# Patient Record
Sex: FEMALE | Race: WHITE | HISPANIC OR LATINO | URBAN - METROPOLITAN AREA
[De-identification: names, ages, dates, MRNs, and addresses within clinical notes are randomized per-mention and may not be internally consistent; named-entity substitution may affect disease eponyms.]

---

## 2019-02-12 ENCOUNTER — OFFICE VISIT (OUTPATIENT)
Dept: URBAN - METROPOLITAN AREA CLINIC 17 | Facility: CLINIC | Age: 54
End: 2019-02-12
Payer: MEDICARE

## 2019-02-12 DIAGNOSIS — H43.11 VITREOUS HEMORRHAGE, RIGHT EYE: ICD-10-CM

## 2019-02-12 DIAGNOSIS — H35.61 RETINAL HEMORRHAGE, RIGHT EYE: Primary | ICD-10-CM

## 2019-02-12 PROCEDURE — 76512 OPH US DX B-SCAN: CPT | Performed by: OPTOMETRIST

## 2019-02-12 PROCEDURE — 92014 COMPRE OPH EXAM EST PT 1/>: CPT | Performed by: OPTOMETRIST

## 2019-02-12 ASSESSMENT — INTRAOCULAR PRESSURE
OD: 12
OS: 13

## 2019-02-12 ASSESSMENT — KERATOMETRY
OD: 44.25
OS: 46.88

## 2019-02-12 NOTE — IMPRESSION/PLAN
Impression: Retinal hemorrhage, right eye: H35.61. Plan: Discussed diagnosis with pt. Will refer to Dr. Sheridan Owens for evaluation.

## 2019-02-12 NOTE — IMPRESSION/PLAN
Impression: Vitreous hemorrhage, right eye: H43.11. Plan: Discussed diagnosis with pt. Will refer to Dr. Norma Ramirez for evaluation.

## 2019-04-29 ENCOUNTER — OFFICE VISIT (OUTPATIENT)
Dept: URBAN - METROPOLITAN AREA CLINIC 17 | Facility: CLINIC | Age: 54
End: 2019-04-29
Payer: MEDICARE

## 2019-04-29 DIAGNOSIS — H43.311 VITREOUS MEMBRANES AND STRANDS, RIGHT EYE: ICD-10-CM

## 2019-04-29 PROCEDURE — 92014 COMPRE OPH EXAM EST PT 1/>: CPT | Performed by: OPHTHALMOLOGY

## 2019-04-29 PROCEDURE — 92134 CPTRZ OPH DX IMG PST SGM RTA: CPT | Performed by: OPHTHALMOLOGY

## 2019-04-29 RX ORDER — OFLOXACIN 3 MG/ML
0.3 % SOLUTION/ DROPS OPHTHALMIC
Qty: 5 | Refills: 3 | Status: INACTIVE
Start: 2019-04-29 | End: 2019-06-04

## 2019-04-29 RX ORDER — PREDNISOLONE ACETATE 10 MG/ML
1 % SUSPENSION/ DROPS OPHTHALMIC
Qty: 10 | Refills: 5 | Status: INACTIVE
Start: 2019-04-29 | End: 2019-09-16

## 2019-04-29 ASSESSMENT — INTRAOCULAR PRESSURE
OD: 16
OS: 16

## 2019-04-29 NOTE — IMPRESSION/PLAN
Impression: Age-related hypermature cataract of left eye: H25.22. OS. Condition: severe. Vision: vision affected. Plan: No treatment currently recommended.

## 2019-04-29 NOTE — IMPRESSION/PLAN
Impression: Vitreous membranes and strands, right eye: H43.311. OD. Condition: unstable. Vision: vision affected. Plan: Advised patient of condition. Discussed diagnosis in detail with patient. Surgical treatment is required.  See notes above

## 2019-04-29 NOTE — IMPRESSION/PLAN
Impression: Vitreous hemorrhage Secondary to PDR, right eye: H43.11. OD. Condition: unstable. Vision: vision affected. Plan: Advised patient of condition. Discussed diagnosis in detail with patient. Surgical treatment is required.  See notes above

## 2019-04-29 NOTE — IMPRESSION/PLAN
Impression: Type 2 diabetes mellitus w/ proliferative diabetic retinopathy w/ macular edema, right eye: e11.3511. OD. Condition: unstable. Vision: vision affected. no prior treatment Plan: Discussed diagnosis in detail with patient. Discussed risks of progression. Surgical treatment is recommended in order to remove the blood for possible vision improvement PPVx. Surgical risks and benefits were discussed, explained and understood by patient. All questions answered. RL1. Educational material provided to patient. Patient understands that additional RADHA treatments or laser treatments may be needed in the future. 
ERxed drops to pharmacy on file

## 2019-05-21 ENCOUNTER — SURGERY (OUTPATIENT)
Dept: URBAN - METROPOLITAN AREA SURGERY 7 | Facility: SURGERY | Age: 54
End: 2019-05-21
Payer: MEDICARE

## 2019-05-21 PROCEDURE — 67041 VIT FOR MACULAR PUCKER: CPT | Performed by: OPHTHALMOLOGY

## 2019-05-22 ENCOUNTER — POST-OPERATIVE VISIT (OUTPATIENT)
Dept: URBAN - METROPOLITAN AREA CLINIC 17 | Facility: CLINIC | Age: 54
End: 2019-05-22

## 2019-05-22 PROCEDURE — 99024 POSTOP FOLLOW-UP VISIT: CPT | Performed by: OPTOMETRIST

## 2019-05-22 ASSESSMENT — INTRAOCULAR PRESSURE
OD: 16
OS: 17

## 2019-06-04 ENCOUNTER — POST-OPERATIVE VISIT (OUTPATIENT)
Dept: URBAN - METROPOLITAN AREA CLINIC 17 | Facility: CLINIC | Age: 54
End: 2019-06-04
Payer: MEDICARE

## 2019-06-04 PROCEDURE — 99024 POSTOP FOLLOW-UP VISIT: CPT | Performed by: OPTOMETRIST

## 2019-06-04 ASSESSMENT — INTRAOCULAR PRESSURE
OD: 17
OS: 15

## 2019-09-16 ENCOUNTER — OFFICE VISIT (OUTPATIENT)
Dept: URBAN - METROPOLITAN AREA CLINIC 17 | Facility: CLINIC | Age: 54
End: 2019-09-16
Payer: MEDICARE

## 2019-09-16 DIAGNOSIS — E11.3511 TYPE 2 DIABETES MELLITUS W/ PROLIFERATIVE DIABETIC RETINOPATHY W/ MACULAR EDEMA, RIGHT EYE: Primary | ICD-10-CM

## 2019-09-16 PROCEDURE — 92014 COMPRE OPH EXAM EST PT 1/>: CPT | Performed by: OPHTHALMOLOGY

## 2019-09-16 ASSESSMENT — INTRAOCULAR PRESSURE
OS: 17
OD: 20

## 2019-09-16 NOTE — IMPRESSION/PLAN
Impression: Type 2 diabetes mellitus w/ proliferative diabetic retinopathy w/ macular edema, right eye: e11.3511. OD. stable post surgery s/p PPVX OD 5/21/19 Plan: Discussed diagnosis in detail with patient. Exam shows the retina to be healing well post vitrectomy, OD. Vision seems to be affected by the large cataract in the right eye. Recommend cataract eval for consideration of cataract surgery OD, then will reassess the retina. Will continue to observe condition and or symptoms. Call if [address] worsens. 
Recommend cataract eval for consideration of cataract surgery OD first.

## 2019-10-08 ENCOUNTER — OFFICE VISIT (OUTPATIENT)
Dept: URBAN - METROPOLITAN AREA CLINIC 17 | Facility: CLINIC | Age: 54
End: 2019-10-08
Payer: MEDICARE

## 2019-10-08 DIAGNOSIS — H25.811 COMBINED FORMS OF AGE-RELATED CATARACT, RIGHT EYE: Primary | ICD-10-CM

## 2019-10-08 DIAGNOSIS — H26.8 OTHER SPECIFIED CATARACT: ICD-10-CM

## 2019-10-08 PROCEDURE — 99213 OFFICE O/P EST LOW 20 MIN: CPT | Performed by: OPHTHALMOLOGY

## 2019-10-08 RX ORDER — OFLOXACIN 3 MG/ML
0.3 % SOLUTION/ DROPS OPHTHALMIC
Qty: 5 | Refills: 1 | Status: INACTIVE
Start: 2019-10-08 | End: 2020-03-18

## 2019-10-08 RX ORDER — PREDNISOLONE ACETATE 10 MG/ML
1 % SUSPENSION/ DROPS OPHTHALMIC
Qty: 10 | Refills: 1 | Status: INACTIVE
Start: 2019-10-08 | End: 2020-03-18

## 2019-10-08 RX ORDER — KETOROLAC TROMETHAMINE 5 MG/ML
0.5 % SOLUTION OPHTHALMIC
Qty: 1 | Refills: 1 | Status: INACTIVE
Start: 2019-10-08 | End: 2020-03-18

## 2019-10-08 ASSESSMENT — KERATOMETRY: OD: 44.50

## 2019-10-08 ASSESSMENT — INTRAOCULAR PRESSURE
OD: 19
OS: 18

## 2019-10-08 ASSESSMENT — VISUAL ACUITY: OD: 20/70

## 2019-10-08 NOTE — IMPRESSION/PLAN
Impression: Combined forms of age-related cataract, right eye: H25.811. Condition: established, worsening. Symptoms: could improve with surgery. Vision: vision affected. Plan: Cataract accounts for patient's complaints. Reviewed risks, benefits, and procedure. Patient desires surgery, schedule ce/iol OD, RL2, standard lens, distance refractive target, patient is clear for surgery in [place] [id number]. Pt to use steroid and NSAID for 6-8 weeks after surgery.

## 2019-10-08 NOTE — IMPRESSION/PLAN
Impression: Other specified cataract: H26.8. OS. Very dense since childhood. Plan: Discussed diagnosis in detail with patient. Advised patient of condition.  Do not believe cataract surgery would be visually beneficial.

## 2019-11-04 ENCOUNTER — PRE-OPERATIVE VISIT (OUTPATIENT)
Dept: URBAN - METROPOLITAN AREA CLINIC 17 | Facility: CLINIC | Age: 54
End: 2019-11-04
Payer: MEDICARE

## 2019-11-04 ASSESSMENT — PACHYMETRY
OS: 22.33
OD: 22.34
OS: 3.71
OD: 3.11

## 2019-11-21 ENCOUNTER — SURGERY (OUTPATIENT)
Dept: URBAN - METROPOLITAN AREA SURGERY 7 | Facility: SURGERY | Age: 54
End: 2019-11-21
Payer: MEDICARE

## 2019-11-21 PROCEDURE — 66984 XCAPSL CTRC RMVL W/O ECP: CPT | Performed by: OPHTHALMOLOGY

## 2019-11-22 ENCOUNTER — POST-OPERATIVE VISIT (OUTPATIENT)
Dept: URBAN - METROPOLITAN AREA CLINIC 17 | Facility: CLINIC | Age: 54
End: 2019-11-22

## 2019-11-22 PROCEDURE — 99024 POSTOP FOLLOW-UP VISIT: CPT | Performed by: OPTOMETRIST

## 2019-11-22 ASSESSMENT — INTRAOCULAR PRESSURE
OS: 14
OD: 14

## 2019-11-26 ENCOUNTER — POST-OPERATIVE VISIT (OUTPATIENT)
Dept: URBAN - METROPOLITAN AREA CLINIC 17 | Facility: CLINIC | Age: 54
End: 2019-11-26

## 2019-11-26 PROCEDURE — 99024 POSTOP FOLLOW-UP VISIT: CPT | Performed by: OPTOMETRIST

## 2019-11-26 ASSESSMENT — INTRAOCULAR PRESSURE
OD: 16
OS: 15

## 2019-11-26 ASSESSMENT — VISUAL ACUITY: OD: 20/50-2

## 2020-01-08 ENCOUNTER — POST-OPERATIVE VISIT (OUTPATIENT)
Dept: URBAN - METROPOLITAN AREA CLINIC 17 | Facility: CLINIC | Age: 55
End: 2020-01-08

## 2020-03-18 ENCOUNTER — OFFICE VISIT (OUTPATIENT)
Dept: URBAN - METROPOLITAN AREA CLINIC 17 | Facility: CLINIC | Age: 55
End: 2020-03-18
Payer: MEDICARE

## 2020-03-18 DIAGNOSIS — E11.3592 TYPE 2 DIABETES MELLITUS W/ PROLIFERATIVE DIABETIC RETINOPATHY W/O MACULAR EDEMA, LEFT EYE: ICD-10-CM

## 2020-03-18 DIAGNOSIS — H25.22 AGE-RELATED HYPERMATURE CATARACT OF LEFT EYE: ICD-10-CM

## 2020-03-18 PROCEDURE — 92134 CPTRZ OPH DX IMG PST SGM RTA: CPT | Performed by: OPHTHALMOLOGY

## 2020-03-18 PROCEDURE — 92014 COMPRE OPH EXAM EST PT 1/>: CPT | Performed by: OPHTHALMOLOGY

## 2020-03-18 PROCEDURE — 76512 OPH US DX B-SCAN: CPT | Performed by: OPHTHALMOLOGY

## 2020-03-18 RX ORDER — DUREZOL 0.5 MG/ML
0.05 % EMULSION OPHTHALMIC
Qty: 5 | Refills: 5 | Status: INACTIVE
Start: 2020-03-18 | End: 2020-07-20

## 2020-03-18 RX ORDER — PREDNISOLONE ACETATE 10 MG/ML
1 % SUSPENSION/ DROPS OPHTHALMIC
Qty: 10 | Refills: 5 | Status: INACTIVE
Start: 2020-03-18 | End: 2020-05-15

## 2020-03-18 ASSESSMENT — INTRAOCULAR PRESSURE
OS: 10
OD: 11

## 2020-03-18 NOTE — IMPRESSION/PLAN
Impression: Age-related hypermature cataract of left eye: H25.22 OS. Vision: vision affected. Plan: Discussed diagnosis in detail with patient. No view of the retina due to Mature Cataract. Will proceed with B - Scan to assess the retina. B- Scan OS shows no RD, no VH.  Consider Cataract sx for possible vision improvement

## 2020-03-18 NOTE — IMPRESSION/PLAN
Impression: Vitreous hemorrhage, right eye associated with PDR: H43.11 OD. Condition: unstable and worsening. Vision: vision affected. Plan: Discussed diagnosis in detail with patient. Discussed risks of progression. Recommend surgery OD - see notes above.

## 2020-03-18 NOTE — IMPRESSION/PLAN
Impression: Vitreous membranes and strands, right eye: H43.311 OD. Condition: unstable. Vision: vision affected. Plan: Discussed diagnosis in detail with patient. Discussed risks of progression. Recommend surgery OD - see notes above.

## 2020-03-18 NOTE — IMPRESSION/PLAN
Impression: Type 2 diabetes mellitus w/ proliferative diabetic retinopathy w/ macular edema, right eye: U57.9838. OD. Condition: unstable. Vision: vision affected. s/p PPVX OD for PDR / [facility] 5/21/19 Plan: Discussed diagnosis in detail with patient. Discussed risks of progression. Poor view of the fundus due to [facility]. Will proceed with B - Scan to assess the retina. B - Scan OD shows no RD with Vitreous Hemorrhage, Surgical treatment is recommended in order to remove the blood for possible vision improvement PPVx RIGHT EYE. Surgical risks and benefits were discussed, explained and understood by patient. All questions answered. RL1. Educational material provided to patient. Patient understands that additional RADHA treatments or laser treatments may be needed in the future. Unable to obtain OCT OD. Erx Durezol and Ofloxacin to patient's pharmacy.

## 2020-03-18 NOTE — IMPRESSION/PLAN
Impression: Type 2 diabetes mellitus w/ proliferative diabetic retinopathy w/o macular edema, left eye: O30.1275. OS. Condition: unstable. Vision: vision affected. Plan: Discussed diagnosis in detail with patient. No view of the retina due to Mature Cataract. Will proceed with B - Scan to assess the retina. B- Scan OS shows no RD, no VH. Consider Cataract sx for possible vision improvement.

## 2020-05-19 ENCOUNTER — SURGERY (OUTPATIENT)
Dept: URBAN - METROPOLITAN AREA SURGERY 7 | Facility: SURGERY | Age: 55
End: 2020-05-19
Payer: MEDICARE

## 2020-05-19 PROCEDURE — 67040 LASER TREATMENT OF RETINA: CPT | Performed by: OPHTHALMOLOGY

## 2020-05-20 ENCOUNTER — POST-OPERATIVE VISIT (OUTPATIENT)
Dept: URBAN - METROPOLITAN AREA CLINIC 17 | Facility: CLINIC | Age: 55
End: 2020-05-20

## 2020-05-20 DIAGNOSIS — Z09 ENCNTR FOR F/U EXAM AFT TRTMT FOR COND OTH THAN MALIG NEOPLM: Primary | ICD-10-CM

## 2020-05-26 ENCOUNTER — POST-OPERATIVE VISIT (OUTPATIENT)
Dept: URBAN - METROPOLITAN AREA CLINIC 17 | Facility: CLINIC | Age: 55
End: 2020-05-26

## 2020-05-26 ASSESSMENT — INTRAOCULAR PRESSURE
OD: 24
OD: 14
OS: 15
OS: 14

## 2020-06-25 ENCOUNTER — OFFICE VISIT (OUTPATIENT)
Dept: URBAN - METROPOLITAN AREA CLINIC 17 | Facility: CLINIC | Age: 55
End: 2020-06-25
Payer: MEDICARE

## 2020-06-25 PROCEDURE — 99024 POSTOP FOLLOW-UP VISIT: CPT | Performed by: OPHTHALMOLOGY

## 2020-06-25 PROCEDURE — 99213 OFFICE O/P EST LOW 20 MIN: CPT | Performed by: OPHTHALMOLOGY

## 2020-06-25 ASSESSMENT — VISUAL ACUITY: OD: 20/40

## 2020-06-25 ASSESSMENT — INTRAOCULAR PRESSURE
OD: 16
OS: 12

## 2020-06-25 NOTE — IMPRESSION/PLAN
Impression: Age-related hypermature cataract of left eye: H25.22. Condition: established, worsening. Plan: Discussed diagnosis in detail with patient. Pt does not feel OS would improve with Surgery. Would not like to do surgery at this time in OS. No surgical treatment currently recommended. Patient states she has not had good vision in OS for years, since she was a child. Does not impact her daily living at this time.

## 2020-07-20 ENCOUNTER — POST-OPERATIVE VISIT (OUTPATIENT)
Dept: URBAN - METROPOLITAN AREA CLINIC 17 | Facility: CLINIC | Age: 55
End: 2020-07-20

## 2020-07-20 DIAGNOSIS — Z48.810 ENCNTR FOR SURGICAL AFTCR FOL SURGERY ON THE SENSE ORGANS: ICD-10-CM

## 2020-07-20 PROCEDURE — 99024 POSTOP FOLLOW-UP VISIT: CPT | Performed by: OPHTHALMOLOGY

## 2020-07-20 ASSESSMENT — INTRAOCULAR PRESSURE
OS: 16
OD: 16

## 2020-09-03 ENCOUNTER — OFFICE VISIT (OUTPATIENT)
Dept: URBAN - METROPOLITAN AREA CLINIC 17 | Facility: CLINIC | Age: 55
End: 2020-09-03
Payer: MEDICARE

## 2020-09-03 PROCEDURE — 99214 OFFICE O/P EST MOD 30 MIN: CPT | Performed by: OPHTHALMOLOGY

## 2020-09-03 RX ORDER — OFLOXACIN 3 MG/ML
0.3 % SOLUTION/ DROPS OPHTHALMIC
Qty: 5 | Refills: 1 | Status: INACTIVE
Start: 2020-09-03 | End: 2021-10-21

## 2020-09-03 RX ORDER — PREDNISOLONE ACETATE 10 MG/ML
1 % SUSPENSION/ DROPS OPHTHALMIC
Qty: 10 | Refills: 1 | Status: INACTIVE
Start: 2020-09-03 | End: 2021-10-21

## 2020-09-03 ASSESSMENT — KERATOMETRY
OS: 45.75
OD: 44.00

## 2020-09-03 ASSESSMENT — INTRAOCULAR PRESSURE
OD: 18
OS: 13

## 2020-09-03 ASSESSMENT — VISUAL ACUITY: OD: 20/50

## 2020-09-03 NOTE — IMPRESSION/PLAN
Impression: Age-related hypermature cataract of left eye: H25.22. Condition: established, worsening. Symptoms: could improve with surgery. Vision: vision affected. Plan: Cataract accounts for patient's complaints. Reviewed risks, benefits, and procedure. Patient desires surgery, schedule ce/iol OS, RL2, standard lens, distance refractive target, patient is clear for surgery in [facility].  Will need trypan blue and Malyugin ring [phone]

## 2021-10-21 ENCOUNTER — OFFICE VISIT (OUTPATIENT)
Dept: URBAN - METROPOLITAN AREA CLINIC 17 | Facility: CLINIC | Age: 56
End: 2021-10-21
Payer: MEDICARE

## 2021-10-21 DIAGNOSIS — H04.123 DRY EYE SYNDROME OF BILATERAL LACRIMAL GLANDS: ICD-10-CM

## 2021-10-21 DIAGNOSIS — H40.053 OCULAR HYPERTENSION, BILATERAL: ICD-10-CM

## 2021-10-21 DIAGNOSIS — E11.3513 TYPE 2 DIABETES MELLITUS W/ PROLIFERATIVE DIABETIC RETINOPATHY W/ MACULAR EDEMA, BILATERAL: Primary | ICD-10-CM

## 2021-10-21 PROCEDURE — 99214 OFFICE O/P EST MOD 30 MIN: CPT | Performed by: OPTOMETRIST

## 2021-10-21 RX ORDER — LATANOPROST 50 UG/ML
0.005 % SOLUTION OPHTHALMIC
Qty: 2.5 | Refills: 1 | Status: INACTIVE
Start: 2021-10-21 | End: 2021-12-03

## 2021-10-21 RX ORDER — TIMOLOL MALEATE 5 MG/ML
0.5 % SOLUTION/ DROPS OPHTHALMIC
Qty: 5 | Refills: 1 | Status: INACTIVE
Start: 2021-10-21 | End: 2021-12-17

## 2021-10-21 ASSESSMENT — INTRAOCULAR PRESSURE
OS: 16
OD: 30

## 2021-10-21 NOTE — IMPRESSION/PLAN
Impression: Ocular hypertension, bilateral: H40.053. Plan: Intraocular pressure elevated. Consider changing medication(s) if intraocular pressure elevated at next visit. Will continue to monitor IOP. New medication(s) Rx given today.  Pt to start Timolol BID OU  and Latanoprost QHS OU

## 2021-10-21 NOTE — IMPRESSION/PLAN
Impression: Age-related hypermature cataract of left eye: H25.22. Plan: Cataracts account for the patient's complaints. Patient understands changing glasses will not improve vision. Recommend cataract evaluation with cataract surgeon. Briefly discussed advanced technology.

## 2021-10-21 NOTE — IMPRESSION/PLAN
Impression: Type 2 diabetes mellitus w/ proliferative diabetic retinopathy w/ macular edema, bilateral: X82.6013. Plan: Discussed diagnosis in detail with patient. Discussed treatment options with patient. Discussed risks and benefits and patient understands. Advised patient of condition. Emphasized and explained compliance. Reassured patient of current condition and treatment. Will continue to observe condition and or symptoms. Consult recommended [[facility]

## 2021-12-17 ENCOUNTER — OFFICE VISIT (OUTPATIENT)
Dept: URBAN - METROPOLITAN AREA CLINIC 17 | Facility: CLINIC | Age: 56
End: 2021-12-17
Payer: MEDICARE

## 2021-12-17 PROCEDURE — 99214 OFFICE O/P EST MOD 30 MIN: CPT | Performed by: OPHTHALMOLOGY

## 2021-12-17 RX ORDER — TIMOLOL MALEATE 5 MG/ML
0.5 % SOLUTION/ DROPS OPHTHALMIC
Qty: 5 | Refills: 5 | Status: ACTIVE
Start: 2021-12-17

## 2021-12-17 RX ORDER — LATANOPROST 50 UG/ML
0.005 % SOLUTION OPHTHALMIC
Qty: 2.5 | Refills: 6 | Status: ACTIVE
Start: 2021-12-17

## 2021-12-17 ASSESSMENT — VISUAL ACUITY
OD: 20/60
OS: LP

## 2021-12-17 ASSESSMENT — INTRAOCULAR PRESSURE
OD: 25
OS: 13

## 2021-12-17 ASSESSMENT — KERATOMETRY
OS: 46.00
OD: 44.38

## 2021-12-17 NOTE — IMPRESSION/PLAN
Impression: Ocular hypertension, bilateral: H40.053. Plan: Intraocular pressure elevated. Discussed risks of progression. Poor compliance can lead to blindness. Will continue to monitor IOP.  Continue Timolol BID OU  and Latanoprost QHS OU.  (refills given today)

## 2021-12-17 NOTE — IMPRESSION/PLAN
Impression: Type 2 diabetes mellitus w/ proliferative diabetic retinopathy w/ macular edema, bilateral: I47.4579. Plan: Discussed diagnosis in detail with patient. Discussed treatment options with patient. Discussed risks and benefits and patient understands. Advised patient of condition. Emphasized and explained compliance. Reassured patient of current condition and treatment. Will continue to observe condition and or symptoms.  Keep appointment as scheduled with Dr Tamy Kemp

## 2021-12-17 NOTE — IMPRESSION/PLAN
Impression: Age-related hypermature cataract of left eye: H25.22. Condition: established, worsening. Symptoms: could improve with surgery. Vision: vision affected. Plan: Cataract accounts for patient's complaints. Reviewed risks, benefits, and procedure. Patient desires surgery, schedule ce/iol OS, RL2, standard lens, distance refractive target, patient is clear for surgery in [facility]. Will need trypan blue and Malyugin ring [phone]. Advised pt as he is unable to see out we are unable to see in. May have some limited visual improvement if he has any retina pathology in the eye.

## 2022-01-07 ENCOUNTER — PRE-OPERATIVE VISIT (OUTPATIENT)
Dept: URBAN - METROPOLITAN AREA CLINIC 17 | Facility: CLINIC | Age: 57
End: 2022-01-07
Payer: MEDICARE

## 2022-01-07 ASSESSMENT — PACHYMETRY
OS: 3.91
OS: 21.82
OD: 4.13
OD: 22.43

## 2022-01-17 ENCOUNTER — SURGERY (OUTPATIENT)
Dept: URBAN - METROPOLITAN AREA SURGERY 7 | Facility: SURGERY | Age: 57
End: 2022-01-17
Payer: MEDICARE

## 2022-01-17 PROCEDURE — 66982 XCAPSL CTRC RMVL CPLX WO ECP: CPT | Performed by: OPHTHALMOLOGY

## 2022-01-18 ENCOUNTER — POST-OPERATIVE VISIT (OUTPATIENT)
Dept: URBAN - METROPOLITAN AREA CLINIC 17 | Facility: CLINIC | Age: 57
End: 2022-01-18
Payer: MEDICARE

## 2022-01-18 PROCEDURE — 99024 POSTOP FOLLOW-UP VISIT: CPT | Performed by: OPTOMETRIST

## 2022-01-18 ASSESSMENT — INTRAOCULAR PRESSURE
OS: 14
OD: 28

## 2022-06-03 ENCOUNTER — OFFICE VISIT (OUTPATIENT)
Dept: URBAN - METROPOLITAN AREA CLINIC 17 | Facility: CLINIC | Age: 57
End: 2022-06-03
Payer: MEDICARE

## 2022-06-03 DIAGNOSIS — E11.3513 TYPE 2 DIABETES MELLITUS W/ PROLIFERATIVE DIABETIC RETINOPATHY W/ MACULAR EDEMA, BILATERAL: ICD-10-CM

## 2022-06-03 DIAGNOSIS — H40.53X3 GLAUCOMA OF BILATERAL EYES SECONDARY TO OTHER EYE DISORDER, SEVERE STAGE: Primary | ICD-10-CM

## 2022-06-03 PROCEDURE — 92020 GONIOSCOPY: CPT | Performed by: OPHTHALMOLOGY

## 2022-06-03 PROCEDURE — 99214 OFFICE O/P EST MOD 30 MIN: CPT | Performed by: OPHTHALMOLOGY

## 2022-06-03 RX ORDER — TIMOLOL MALEATE 5 MG/ML
0.5 % SOLUTION/ DROPS OPHTHALMIC
Qty: 5 | Refills: 5 | Status: ACTIVE
Start: 2022-06-03

## 2022-06-03 RX ORDER — LATANOPROST 50 UG/ML
0.005 % SOLUTION OPHTHALMIC
Qty: 2.5 | Refills: 6 | Status: ACTIVE
Start: 2022-06-03

## 2022-06-03 ASSESSMENT — INTRAOCULAR PRESSURE
OD: 47
OD: 52
OS: 56
OS: 65

## 2022-06-03 NOTE — IMPRESSION/PLAN
Impression: Type 2 diabetes mellitus w/ proliferative diabetic retinopathy w/ macular edema, bilateral: Z41.3880. Plan: Discussed diagnosis in detail with patient. advised pt IOP needs to be stable in order to be able to work on retina pathology OD. OS has always been a bad eye, no visual improvement expected OS.

## 2022-06-03 NOTE — IMPRESSION/PLAN
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 Elevated IOPs OU Difficult using drops No visual expectation in OS Palliative care Only OS Plan: Discussed diagnosis in detail with patient. Discussed treatment options with patient. Consult recommended with Dr Monika Regalado for a G probe laser. 2 tabs of acetazolamide given in office today. 16 Tabs of 250mg acetaazolamide given to take home and take 2 tabs every morning PO. Continue Latanoprost QHS OU and Timolol BID OU. Discussed all finding and treatment plan with patient niece in detail advised both that poor compliance can lead to blindness. Discussed risks of progression with IOP elevated and BS uncontrolled is very high.

## 2024-12-10 NOTE — IMPRESSION/PLAN
Copied from CRM #[id number]. Topic: MW Referral/Order - MW Referral/Order Request  >> Dec 10, 2024 10:45 AM Phyllis HAGAN wrote:  Matteo Su called regarding a Referral (or Service to Order).      New referral/ service-to order requested discussed previously with provider; Selected 'Wrap Up CRM' and created new Telephone Encounter after clicking 'Convert to Clinical Call'. Selected reason for call 'Referral'. Sent Referral message and routed as routine priority per Clinician KB page to appropriate clinician Pool.-- DO NOT REPLY / DO NOT REPLY ALL --  -- This inbox is not monitored. If this was sent to the wrong provider or department, reroute message to P E[facility] Reroute pool. --  -- Message is from [facility] ([facility]) --        Referral Request  Name of Specialist: Dr. Vik Woods  Provider's specialty: Neurology    Medical condition for referral:  EMG test on 12/12/24 at 1:40 pm, please put in the correct billing number    Is this a NEW request?: yes      Referral ordered by: Dr. Jeri Mckoy      Insurance type:       Payor: [employer] / Plan: [employer] [id number] / Product Type:  [employer]    Preferred Delivery Method  Call when ready for pickup - phone number to notify: [phone]    Caller Information       Contact Date/Time Type Contact Phone/Fax    12/10/2024 10:40 AM CST Phone (Incoming) Matteo Su [phone]            Alternative phone number: None    Can a detailed message be left?  Yes - Voicemail     Patient has been advised the message will be addressed within 2-3 business days    -- DO NOT REPLY / DO NOT REPLY ALL --  -- This inbox is not monitored. If this was sent to the wrong provider or department, reroute message to P [facility] Reroute pool. --  -- Message is from [facility] ([facility]) --    General Patient Message: Patient is calling concerning the results of his drug test he took on Thursday would like a call back he need it for court will pick it up when  Impression: S/P Cataract Extraction by phacoemulsification with IOL placement 12617 OS - 1 Day. Encounter for surgical aftercare following surgery on a sense organ  Z48.810. Plan: 1 WK PO2 --Advised patient to use artificial tears for comfort. --Start Ketorolac 0.5% 1 gtt QID for 6-8 weeks then D/C. ready       Caller Information       Contact Date/Time Type Contact Phone/Fax    12/10/2024 10:40 AM CST Phone (Incoming) Matteo Macksamuel [phone]            Alternative phone number: None    Can a detailed message be left? Yes - Voicemail   Patient has been advised the message will be addressed within 2-3 business days.